# Patient Record
Sex: FEMALE | Race: WHITE
[De-identification: names, ages, dates, MRNs, and addresses within clinical notes are randomized per-mention and may not be internally consistent; named-entity substitution may affect disease eponyms.]

---

## 2017-01-03 NOTE — RS.OPPTDN
Subjective


Date of Note: 01/03/17


Visit #: 14


Date of Evaluation: 11/29/16


Payer Source: Medicaid


Treatment Diagnosis: right knee pain, effusion, stiffness, s/p right TKA


Current Subjective/complaints:: Reports having a good day today,minimal knee 

pain at this time.


*Precautions: none





Pain Assessment





- Pain Description


Pain Location: right knee


Pain Description: Dull, Aching


Current Pain Intensity: not rated today





Interventions





- Exercise/Activities/Manual Therapy


Exercises/Activities: 45 mins. total of TKA protocol in supine and sitting,

ended session with 3/15 reps. on leg press with 30#.AROM :flexion 120 extension 

0.


Total minutes of Exercise: 45


Manual Therapy: NA


Total minutes of Manual Therapy: 0


HOME EXERCISE PROGRAM: QS, HS, SLR, hip abd, heel slides in supine and in 

sitting. Passive flexion and extension stretching.





- Charges


Total Direct Minutes: 45


Total Treatment Time: 45


Procedures billed for this date of service:: ex 3


Assessment: Patient reports no increased knee pain today with exercises,only 

fatigue.She has less edema,no warmth today.She has steadier gait ,with much 

better heel strike on the R.


Patient Education: Education of Plan of Care


Patient demonstrates compliance with HEP?: Yes





Short Term Goals


Goal #1: Left knee flexion to 95 degrees.


Goal to be met by: 12/14/16


Progress towards Goal:: Met


Goal #2: L knee to full extension.


Progress towards Goal:: Met


Goal #3: Right knee pain <6/10 at rest.


Goal to be met by: 12/14/16


Progress towards Goal:: Progressing


Goal #4: Pt to demonstrate good heelstrike and knee flexion with ambulation in 

dept.


Goal to be met by: 12/14/16


Progress towards Goal:: Progressing





Long Term Goals


Goal #1: Pt able to continue HEP to maintain functional level at discharge.


Goal to be met by: 01/14/17


Progress towards goal: Partially Met


Goal #2: Score on LE functional scale improved to 50/80.


Goal to be met by: 01/14/17


Progress towards goal: Progressing


Goal #3: Patient to ambulate without Asst device household/community distances.


Goal to be met by: 01/14/17


Progress towards goal: Met


Goal #4: Patient able to perform all functional/work activities without 

difficulty.


Goal to be met by: 01/14/17


Progress towards goal: Progressing





Plan


PLAN OF CARE EXPIRES ON:: 01/14/17


ORDER # VISITS AND/OR THROUGH DATE: 01/14/17


PLAN: Continue Plan of Care

## 2017-01-04 NOTE — RS.OPPTDN
Subjective


Date of Note: 01/04/17


Visit #: 15


Date of Evaluation: 11/29/16


Payer Source: Medicaid


Treatment Diagnosis: right knee pain, effusion, stiffness, s/p right TKA


Current Subjective/complaints:: I had a blister over the top of my incision and 

I alcoholed the end of a needle and busted it.  Some yellowish puss came out 

and something weird that looked like a hair.  It feels a lot better now.


*Precautions: none





Pain Assessment





- Pain Description


Pain Location: right knee


Pain Description: Dull, Aching


Current Pain Intensity: not rated today





Interventions





- Exercise/Activities/Manual Therapy


Exercises/Activities: 45 mins. total of TKA protocol in supine and sitting 3/17 

with 5 lbs for SAQ, LAQ, HS.  SLR caused increased right groin pain.  Multiple 

trigger points and tender points were palpable in this area.  She also 

performed 3/15 reps. on leg press with 45#.


Manual Therapy: NA


HOME EXERCISE PROGRAM: QS, HS, SLR, hip abd, heel slides in supine and in 

sitting. Passive flexion and extension stretching.





- Charges


Total Direct Minutes: 45


Total Treatment Time: 45


Procedures billed for this date of service:: ther ex X 3


Assessment: Patient tolerated tx well and has no increase in pain with increase 

in resistance.


Patient demonstrates compliance with HEP?: Yes





Short Term Goals


Goal #1: Left knee flexion to 95 degrees.


Goal to be met by: 12/14/16


Progress towards Goal:: Met


Goal #2: L knee to full extension.


Progress towards Goal:: Met


Goal #3: Right knee pain <6/10 at rest.


Goal to be met by: 12/14/16


Progress towards Goal:: Progressing


Goal #4: Pt to demonstrate good heelstrike and knee flexion with ambulation in 

dept.


Goal to be met by: 12/14/16


Progress towards Goal:: Progressing





Long Term Goals


Goal #1: Pt able to continue HEP to maintain functional level at discharge.


Goal to be met by: 01/14/17


Progress towards goal: Partially Met


Goal #2: Score on LE functional scale improved to 50/80.


Goal to be met by: 01/14/17


Progress towards goal: Progressing


Goal #3: Patient to ambulate without Asst device household/community distances.


Goal to be met by: 01/14/17


Progress towards goal: Met


Goal #4: Patient able to perform all functional/work activities without 

difficulty.


Goal to be met by: 01/14/17


Progress towards goal: Progressing





Plan


PLAN OF CARE EXPIRES ON:: 01/14/17


ORDER # VISITS AND/OR THROUGH DATE: 01/14/17


PLAN: Continue Plan of Care

## 2017-01-06 NOTE — RS.OPPTDN
Subjective


Date of Note: 01/06/17


Visit #: 16


Date of Evaluation: 11/29/16


Payer Source: Medicaid


Treatment Diagnosis: right knee pain, effusion, stiffness, s/p right TKA


Current Subjective/complaints:: Reports he rpain is elevated today ovwerall,not 

just the R knee.(Patient has RA.)


*Precautions: none





Pain Assessment





- Pain Description


Pain Location: right knee


Pain Description: Dull, Aching


Current Pain Intensity: 7/10


Other Comments regarding Pain:: less pain after heat and exercise





- Heat/Cryotherapy


Treatment: Hot Pack (20 mins. prior to exercises)





Interventions





- Exercise/Activities/Manual Therapy


Exercises/Activities: 35 mins. total of TKA protocol in supine and sitting 3/17 

with 3 lbs for SAQ, LAQ, HS.  SLR without resitance today   She also performed 3

/15 reps. on leg press with 45#.


Total minutes of Exercise: 35


Manual Therapy: NA


Total minutes of Manual Therapy: 0


HOME EXERCISE PROGRAM: QS, HS, SLR, hip abd, heel slides in supine and in 

sitting. Passive flexion and extension stretching.





- Charges


Total Direct Minutes: 35


Total Treatment Time: 55


Procedures billed for this date of service:: hp,ex 2


Assessment: Patient continues to have improved firing of the quads,functional 

ROM with less intense pain at end ROM.She is highly motivated to improve ,

compliant to all recommendations of the therapy staff.


Patient Education: Education of diagnosis, Body/Joint mechanics, Home Exercise 

Program, Home Safety, Activity Modification, Education of Plan of Care


Patient demonstrates compliance with HEP?: Yes





Short Term Goals


Goal #1: Left knee flexion to 95 degrees.


Goal to be met by: 12/14/16


Progress towards Goal:: Met


Goal #2: L knee to full extension.


Progress towards Goal:: Met


Goal #3: Right knee pain <6/10 at rest.


Goal to be met by: 12/14/16


Progress towards Goal:: Partially Met


Goal #4: Pt to demonstrate good heelstrike and knee flexion with ambulation in 

dept.


Goal to be met by: 12/14/16


Progress towards Goal:: Partially Met





Long Term Goals


Goal #1: Pt able to continue HEP to maintain functional level at discharge.


Goal to be met by: 01/14/17


Progress towards goal: Partially Met


Goal #2: Score on LE functional scale improved to 50/80.


Goal to be met by: 01/14/17


Progress towards goal: Progressing


Goal #3: Patient to ambulate without Asst device household/community distances.


Goal to be met by: 01/14/17


Progress towards goal: Met


Goal #4: Patient able to perform all functional/work activities without 

difficulty.


Goal to be met by: 01/14/17


Progress towards goal: Progressing





Plan


PLAN OF CARE EXPIRES ON:: 01/14/17


ORDER # VISITS AND/OR THROUGH DATE: 01/14/17


PLAN: Continue Plan of Care

## 2017-01-09 ENCOUNTER — HOSPITAL ENCOUNTER (OUTPATIENT)
Dept: HOSPITAL 58 - REHAB | Age: 42
LOS: 22 days | End: 2017-01-31

## 2017-01-09 VITALS — BODY MASS INDEX: 39.9 KG/M2

## 2017-01-09 DIAGNOSIS — Z96.651: ICD-10-CM

## 2017-01-09 DIAGNOSIS — M06.861: Primary | ICD-10-CM

## 2017-01-09 NOTE — RS.OPPTDN
Subjective


Date of Note: 01/09/17


Visit #: 17


Date of Evaluation: 11/29/16


Payer Source: Medicaid


Treatment Diagnosis: right knee pain, effusion, stiffness, s/p right TKA


Current Subjective/complaints:: Reports the weather change is causing her knee 

to ache ,but it is tolerable.


*Precautions: none





Pain Assessment





- Pain Description


Pain Location: right knee


Pain Description: Dull, Aching


Current Pain Intensity: 4/10





- Heat/Cryotherapy


Treatment: Cryotherapy (20 mins. prior to exercises)





Interventions





- Exercise/Activities/Manual Therapy


Exercises/Activities: 40 mins. total of TKA protocol in supine and sitting 3/15 

reps. with 4 lbs for SAQ, LAQ, HS. Ankle pumps with red theraband SLR without 

resitance today   She also performed 3/15 reps. on leg press with 45#.Supine 

AROM is 117 flexion,0 extension.


Total minutes of Exercise: 40


Manual Therapy: NA


Total minutes of Manual Therapy: 0


HOME EXERCISE PROGRAM: QS, HS, SLR, hip abd, heel slides in supine and in 

sitting. Passive flexion and extension stretching.





- Charges


Total Direct Minutes: 40


Total Treatment Time: 60


Procedures billed for this date of service:: cp,ex 3


Assessment: Patient continues to have improved strength in quads,minimal 

decrease in flexion today due to minimal increase in edema,but no elevated 

warmth in the R knee.She has excellent understanding of HEP and joint 

protection.


Patient Education: Education of diagnosis, Body/Joint mechanics, Home Exercise 

Program, Home Safety, Activity Modification, Education of Plan of Care


Patient demonstrates compliance with HEP?: Yes





Short Term Goals


Goal #1: Left knee flexion to 95 degrees.


Goal to be met by: 12/14/16


Progress towards Goal:: Met


Goal #2: L knee to full extension.


Progress towards Goal:: Met


Goal #3: Right knee pain <6/10 at rest.


Goal to be met by: 12/14/16


Progress towards Goal:: Met


Goal #4: Pt to demonstrate good heelstrike and knee flexion with ambulation in 

dept.


Goal to be met by: 12/14/16


Progress towards Goal:: Partially Met





Long Term Goals


Goal #1: Pt able to continue HEP to maintain functional level at discharge.


Goal to be met by: 01/14/17


Progress towards goal: Partially Met


Goal #2: Score on LE functional scale improved to 50/80.


Goal to be met by: 01/14/17


Progress towards goal: Progressing


Goal #3: Patient to ambulate without Asst device household/community distances.


Goal to be met by: 01/14/17


Progress towards goal: Met


Goal #4: Patient able to perform all functional/work activities without 

difficulty.


Goal to be met by: 01/14/17


Progress towards goal: Progressing





Plan


PLAN OF CARE EXPIRES ON:: 01/14/17


ORDER # VISITS AND/OR THROUGH DATE: 01/14/17


PLAN: Continue Plan of Care

## 2017-01-11 NOTE — RS.QUICKDC
Discharge from PT


Date of Discharge: 06/28/13


Number of Visits: 17


Reason for Discharge: Patient called and cancelled due to flare up of RA 

pain.She returns to work this week, was to be her last PT session.She is 

pleased with her progress,has good understanding of her HEP ,joint protection.

## 2017-04-12 ENCOUNTER — HOSPITAL ENCOUNTER (EMERGENCY)
Dept: HOSPITAL 58 - ED | Age: 42
Discharge: HOME | End: 2017-04-12

## 2017-04-12 VITALS — SYSTOLIC BLOOD PRESSURE: 186 MMHG | DIASTOLIC BLOOD PRESSURE: 113 MMHG | TEMPERATURE: 96.7 F

## 2017-04-12 VITALS — BODY MASS INDEX: 37.8 KG/M2

## 2017-04-12 DIAGNOSIS — R63.4: ICD-10-CM

## 2017-04-12 DIAGNOSIS — Z79.899: ICD-10-CM

## 2017-04-12 DIAGNOSIS — I10: ICD-10-CM

## 2017-04-12 DIAGNOSIS — R07.89: Primary | ICD-10-CM

## 2017-04-12 LAB
ALBUMIN SERPL-MCNC: 3.8 G/DL (ref 3.4–5)
ALBUMIN/GLOB SERPL: 1 {RATIO}
ALP SERPL-CCNC: 101 U/L (ref 42–98)
ALT SERPL-CCNC: 15 U/L (ref 12–78)
ANION GAP SERPL CALC-SCNC: 13.7 MMOL/L
AST SERPL-CCNC: 14 U/L (ref 15–37)
BASOPHILS # BLD AUTO: 0 K/UL (ref 0–0.2)
BASOPHILS NFR BLD AUTO: 0.5 % (ref 0–3)
BILIRUB SERPL-MCNC: 0.27 MG/DL (ref 0–1.2)
BUN SERPL-MCNC: 13 MG/DL (ref 7–18)
BUN/CREAT SERPL: 19.69
CALCIUM SERPL-MCNC: 9.5 MG/DL (ref 8.2–10.2)
CHLORIDE SERPL-SCNC: 104 MMOL/L (ref 98–107)
CK SERPL-CCNC: 45 U/L
CO2 BLD-SCNC: 24 MMOL/L (ref 21–32)
CREAT SERPL-MCNC: 0.66 MG/DL (ref 0.6–1.3)
EOSINOPHIL # BLD AUTO: 0.2 K/UL (ref 0–0.7)
EOSINOPHIL NFR BLD AUTO: 2.6 % (ref 0–7)
GFR SERPLBLD BASED ON 1.73 SQ M-ARVRAT: 97 ML/MIN
GLOBULIN SER CALC-MCNC: 3.8 G/L
GLUCOSE SERPL-MCNC: 97 MG/DL (ref 70–110)
HCT VFR BLD AUTO: 32.3 % (ref 37–47)
HGB BLD-MCNC: 10.4 G/DL (ref 12–16)
IMM GRANULOCYTES NFR BLD AUTO: 0.3 % (ref 0–5)
LYMPHOCYTES # SPEC AUTO: 2.6 K/UL (ref 0.6–3.4)
LYMPHOCYTES NFR BLD AUTO: 30 % (ref 10–50)
MCH RBC QN: 23.9 PG (ref 27–31)
MCHC RBC AUTO-ENTMCNC: 32.2 G/DL (ref 31.8–35.4)
MCV RBC: 74.3 FL (ref 81–99)
MONOCYTES # BLD AUTO: 0.6 K/UL (ref 0.4–2)
MONOCYTES NFR BLD AUTO: 6.3 % (ref 0–10)
NEUTROPHILS # BLD AUTO: 5.3 K/UL (ref 2–6.9)
NEUTROPHILS NFR BLD AUTO: 60.3 %
PLATELET # BLD AUTO: 363 10^3/UL (ref 140–440)
POTASSIUM SERPL-SCNC: 3.7 MMOL/L (ref 3.5–5.1)
PROT SERPL-MCNC: 7.6 G/DL (ref 6.4–8.2)
RBC # BLD AUTO: 4.35 10^6/UL (ref 4.2–5.4)
SODIUM SERPL-SCNC: 138 MMOL/L (ref 136–145)
WBC # BLD AUTO: 8.71 K/UL (ref 4.6–10.2)

## 2017-04-12 PROCEDURE — 82550 ASSAY OF CK (CPK): CPT

## 2017-04-12 PROCEDURE — 99283 EMERGENCY DEPT VISIT LOW MDM: CPT

## 2017-04-12 PROCEDURE — 36415 COLL VENOUS BLD VENIPUNCTURE: CPT

## 2017-04-12 PROCEDURE — 80053 COMPREHEN METABOLIC PANEL: CPT

## 2017-04-12 PROCEDURE — 85025 COMPLETE CBC W/AUTO DIFF WBC: CPT

## 2017-04-12 PROCEDURE — 83880 ASSAY OF NATRIURETIC PEPTIDE: CPT

## 2017-04-12 PROCEDURE — 93005 ELECTROCARDIOGRAM TRACING: CPT

## 2017-04-12 PROCEDURE — 84484 ASSAY OF TROPONIN QUANT: CPT

## 2017-04-12 PROCEDURE — 93010 ELECTROCARDIOGRAM REPORT: CPT

## 2017-04-12 NOTE — DI
EXAM:  Single view of the chest. 

  

History:  Chest pain. 

  

Comparison:  Chest radiograph 05/26/2016 

  

Findings:  Heart size is normal.  No focal consolidation.  No appreciable pleural fluid and no pneum
othorax.  No acute osseous abnormalities. 

  

Impression:  No acute cardiopulmonary process.

## 2017-04-12 NOTE — ED.PDOC
General


ED Provider: 


Dr. BRIGITTE MCGEE JR





Chief Complaint: Chest Pain


Stated Complaint: CHEST DISCOMFORT, ELEVATED B/P AND HEADACHE for 3 dasy noted 

excessive blood pressure at work many years of lisiinopril[End]96.7 86 16 99% 

186.113 8/10.  note has had 25 pound weight loss withminimal effort- expressed 

concern to PMD no bruising abdominal pain focal signs- recc follow up with PMD


Time Seen by Physician: 13:44


Mode of Arrival: Walk-In


Information Source: Patient


Exam Limitations: No limitations


Primary Care Provider: 


PHUC LAWSON





Nursing and Triage Documentation Reviewed and Agree: No





Review of Systems





- Review Of Systems


Constitutional: Reports: Malaise


Eyes: Reports: No symptoms


Respiratory: Reports: No symptoms


Cardiac: Reports: Chest pain


GI: Reports: No symptoms


: Reports: No symptoms


Musculoskeletal: Reports: Joint pain, Muscle pain


Skin: Reports: No symptoms


Neurological: Reports: No symptoms


Endocrine: Reports: No symptoms


Hematologic/Lymphatic: Reports: No symptoms


All Other Systems: Other





Past Medical History





- Past Medical History


Previously Healthy: No (R.A.)


Endocrine: Reports: None


Cardiovascular: Reports: Hypertension


Respiratory: Reports: None


Hematological: Reports: Anemia


Gastrointestinal: Reports: GERD


Genitourinary: Reports: Kidney stones


Neuro/Psych: Reports: None


Musculoskeletal: Reports: Arthritis (RA)


Cancer: Reports: None


Last Menstrual Period: NOW


Other Pertinent Past Medical History: RA





- Surgical History


General Surgical History: Reports: Tubal ligation, , Cholecystectomy, 

Tonsillectomy, Adenoidectomy, Orthopedic ( LEFT KNEE REPLACED, RIGHT KNEE 

REPLACED)





- Family History


Family History: Reports: Unknown





- Social History


Smoking Status: Never smoker


Hx Substance Use: No


Alcohol Screening: None





Physical Exam





- Physical Exam


Appearance: Well-appearing, Obese





Interpretation





- Radiology Interpretation


Radiology Interpretation By: Radiologist


Radiology Results: No acute changes


Exam Interpreted: CXR





- EKG Interpretation


Time of EKG #1: 14:00


Rate: Normal


Rhythm: Sinus


Ectopy: None


Axis: NL


ST Segment: Normal





Re-Evaluation





- Re-Evaluation


Time of Re-Evaluation: 14:31 (pain and tenderness left ant chest and scapula 

pleuritic in character)


Status: Improved





Critical Care Note





- Critical Care Note


Total Time (mins): 20





Course





- Course


Hematology/Chemistry: 


 17 14:00





 17 14:00


Orders, Labs, Meds: 


Lab Review











  17





  14:00


 


WBC  8.71


 


RBC  4.35


 


Hgb  10.4 L


 


Hct  32.3 L


 


MCV  74.3 L


 


MCH  23.9 L


 


MCHC  32.2


 


RDW Coeff of Yue  19.1 H


 


Plt Count  363


 


Immature Gran % (Auto)  0.3


 


Neut % (Auto)  60.3


 


Lymph % (Auto)  30.0


 


Mono % (Auto)  6.3


 


Eos % (Auto)  2.6


 


Baso % (Auto)  0.5


 


Immature Gran # (Auto)  0.0


 


Neut #  5.3


 


Lymph #  2.6


 


Mono #  0.6


 


Eos #  0.2


 


Baso #  0.0


 


Sodium  138


 


Potassium  3.7


 


Chloride  104


 


Carbon Dioxide  24


 


Anion Gap  13.7


 


BUN  13


 


Creatinine  0.66


 


Estimated GFR (MDRD)  97.00


 


BUN/Creatinine Ratio  19.69


 


Glucose  97


 


Calcium  9.5


 


Total Bilirubin  0.27


 


AST  14 L


 


ALT  15


 


Alkaline Phosphatase  101 H


 


Total Creatine Kinase  45


 


Troponin I  < 0.0100


 


B-Natriuretic Peptide  74


 


Total Protein  7.6


 


Albumin  3.8


 


Globulin  3.8


 


Albumin/Globulin Ratio  1.00








Orders











 Category Date Time Status


 


 EKG-(ED ONLY) Stat CARDIO  17 13:45 Completed


 


 ED CARDIAC MONITOR APPLIED .ONCE EMERGENCY  17 13:45 Active


 


 ED IV/MEDIPORT/POWERPORT .ONCE EMERGENCY  17 13:45 Active


 


 B-TYPE NATRIURETIC PEPTIDE Stat LAB  17 14:00 Completed


 


 CBC W/ AUTO DIFF Stat LAB  17 14:00 Completed


 


 COMPREHENSIVE METABOLIC PANEL Stat LAB  17 14:00 Completed


 


 CREATINE KINASE Stat LAB  17 14:00 Completed


 


 TROPONIN I Stat LAB  17 14:00 Completed


 


 0.9 % Sodium Chloride [Saline Flush] MEDS  17 13:45 Active





 1 syr IVF PRN PRN   


 


 Aspirin [Aspirin Chewable] MEDS  17 13:45 Discontinued





 324 mg PO ONCE STA   


 


 Lisinopril [Zestril] MEDS  17 14:44 Discontinued





 20 mg PO ONCE STA   


 


 Nitroglycerin [Nitrostat] MEDS  17 13:45 Active





 0.4 mg SL Q5MIN X 3 DOSES PRN   


 


 CHEST, 1V AP ONLY Stat RADS  17 13:45 Completed








Medications











Generic Name Dose Route Start Last Admin





  Trade Name Freq  PRN Reason Stop Dose Admin


 


Nitroglycerin  0.4 mg  17 13:45  





  Nitrostat  SL   





  Q5MIN X 3 DOSES PRN   





  Chest Pain   


 


Sodium Chloride  1 syr  17 13:45  17 14:26





  Saline Flush  IVF   1 syr





  PRN PRN   Administration





  To flush IV   














Discontinued Medications














Generic Name Dose Route Start Last Admin





  Trade Name Freq  PRN Reason Stop Dose Admin


 


Aspirin  324 mg  17 13:45  17 14:25





  Aspirin Chewable  PO  17 13:46  324 mg





  ONCE STA   Administration


 


Lisinopril  20 mg  17 14:44  17 14:56





  Zestril  PO  17 14:45  20 mg





  ONCE STA   Administration











Vital Signs: 


 











  Temp Pulse Resp BP Pulse Ox


 


 17 13:33  96.7 F L  86  16  186/113 H  99














CHARAN Risk Score


CHARAN Risk Score: 


Risk Score      Odds of death by 30D


      0                 0.1 (0.1-0.2)


      1                 0.3 (0.2-0.3)


      2                 0.4 (0.3-0.5)


      3                 0.7 (0.6-0.9)


      4                 1.2 (1.0-1.5)


      5                 2.2 (1.9-2.6)


      6                 3.0 (2.5-3.6)


      7                 4.8 (3.8-6.1)








Departure





- Departure


Time of Disposition: 14:38


Disposition: HOME SELF-CARE


Discharge Problem: 


 Chest pain





Instructions:  Chest Wall Pain (ED)


Condition: Good


Pt referred to PMD for follow-up: Yes


Additional Instructions: 


increase lisinopril to 20 mg daily


check blood pressure daily and record


follow up pmd 1-2 weeks sooner if not resolved


discuss weight loss with PMD


discuss use of aspirin for pain with rheumatologist


may use ultram for chest wall pain not controlled with NSAIDS





Prescriptions: 


Lisinopril 20 mg PO DAILY #30 tablet


Allergies/Adverse Reactions: 


Allergies





hydroxychloroquine sulfate [From Plaquenil] Adverse Reaction (Verified 17 

13:28)


 








Home Medications: 


Ambulatory Orders





Folic Acid 0.4 mg PO DAILY 09/16/15 


Ibuprofen 800 mg PO BID 09/16/15 


Lisinopril 10 mg PO DAILY 09/16/15 


Pantoprazole Sodium [Protonix] 40 mg PO DAILY #30 tablet. 16 


Tramadol HCl [Ultram] 50 mg PO Q6H PRN #14 tablet 10/03/16 


Adalimumab [Humira] 40 mg SQ WEEKLY 17 


Lisinopril 20 mg PO DAILY #30 tablet 17 


Methotrexate Sodium/Pf [Methotrexate 1 Gram/40 ml Vial] 1 ml IJ WEEKLY 17

## 2018-06-19 ENCOUNTER — HOSPITAL ENCOUNTER (EMERGENCY)
Dept: HOSPITAL 58 - ED | Age: 43
Discharge: HOME | End: 2018-06-19

## 2018-06-19 VITALS — BODY MASS INDEX: 33.3 KG/M2

## 2018-06-19 VITALS — SYSTOLIC BLOOD PRESSURE: 146 MMHG | DIASTOLIC BLOOD PRESSURE: 93 MMHG | TEMPERATURE: 97.4 F
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
## 2018-06-19 DIAGNOSIS — S93.401A: Primary | ICD-10-CM

## 2018-06-19 PROCEDURE — 99283 EMERGENCY DEPT VISIT LOW MDM: CPT

## 2018-06-19 NOTE — DI
EXAM: Right foot; PA, lateral, and oblique views 

  

HISTORY:  Right foot trauma, twisting injury 

  

FINDINGS: Plantar and right calcaneal enthesophytes are noted.  The bones are osteopenic.  There is m
ild osteoarthritis within the forefoot diffusely. No fracture or subluxation are detected. 

  

OPINION: 

  

No fracture or subluxation. 

  

Osteopenia. 

  

Mild osteoarthritis in the forefoot.

## 2018-06-19 NOTE — ED.PDOC
General


ED Provider: 


Dr. SONNY GARZA





Chief Complaint: Foot Pain/Injury


Stated Complaint: Rt Foot and ankle pain.  Twisted her ankle last week when 

stepping on an object on her walkway. Pain persisted and area edematous. Hx RA


Time Seen by Physician: 18:15


Mode of Arrival: Walk-In


Information Source: Patient


Exam Limitations: No limitations


Primary Care Provider: 


PHUC LAWSON





Nursing and Triage Documentation Reviewed and Agree: Yes


Reviewed sepsis parameters & appropriate labs ordered?: Yes


System Inflammatory Response Syndrome: Not Applicable


Sepsis Protocol: 


For patient's 13 years and over:





Temp is 96.8 and below  and greater


Pulse >90 BPM


Resp >20/minute


Acutely Altered Mental Status





Are patient's symptoms suggestive of a new infection, such as:


   -Pneumonia


   -Skin, Soft Tissue


   -Endocarditis


   -UTI


   -Bone, Joint Infection


   -Implantable Device


   -Acute Abdominal Infection


   -Wound Infection


   -Meningitis


   -Blood Stream Catheter Infection


   -Unknown








Musculoskeletal Complaint Exam





- Ankle/Foot Complaint/Exam


Location of Injury: Reports: Right, Ankle, Foot


Mechanism of Injury: Reports: Trauma


Onset/Duration: 1 week


Symptoms Are: Reports: Still present


Onset of Pain: Reports: Immediate


Initial Severity: Moderate


Current Severity: Moderate


Location: Reports: Discrete, Radiating


Character: Reports: Sharp, Aching


Alleviating: Reports: Rest


Aggravating: Reports: Movement, Weight bearing


Able to Bear Weight: Yes


Associated Signs and Symptoms: Reports: Swelling


Related History: Denies: Similar episode, Occupational injury


Gout Risk Factors: Reports: None


Related Surgical History: Reports: None


Lower Extremity Findings: Present: Swelling


Achilles Tendon Abnormality: No


Tenderness: Present: Lateral malleolus, Midfoot, Metatarsals (5th Base 5th MT)


Differential Diagnosis: Closed Fracture, Sprain





Review of Systems





- Review Of Systems


Constitutional: Reports: No symptoms


Eyes: Reports: No symptoms


Ears, Nose, Mouth, Throat: Reports: No symptoms


Respiratory: Reports: No symptoms


Cardiac: Reports: No symptoms


GI: Reports: No symptoms


: Reports: No symptoms


Musculoskeletal: Reports: No symptoms, Back pain, Joint pain, Joint swelling, 

Muscle pain, Muscle stiffness


Skin: Reports: No symptoms


Neurological: Reports: No symptoms


Endocrine: Reports: No symptoms


Hematologic/Lymphatic: Reports: No symptoms


All Other Systems: Reviewed and Negative





Past Medical History





- Past Medical History


Previously Healthy: No (R.A.)


Endocrine: Reports: None


Cardiovascular: Reports: Hypertension


Respiratory: Reports: None


Hematological: Reports: Anemia


Gastrointestinal: Reports: GERD


Genitourinary: Reports: Kidney stones


Neuro/Psych: Reports: None


Musculoskeletal: Reports: Arthritis (RA)


Cancer: Reports: None


Last Menstrual Period: 1 week ago


Other Pertinent Past Medical History: RA





- Surgical History


General Surgical History: Reports: Tubal ligation, , Cholecystectomy, 

Tonsillectomy, Adenoidectomy, Orthopedic ( LEFT KNEE REPLACED, RIGHT KNEE 

REPLACED)





- Family History


Family History: Reports: Unknown





- Social History


Smoking Status: Never smoker


Hx Substance Use: No


Alcohol Screening: None





Physical Exam





- Physical Exam


Appearance: Well-appearing


Pain Distress: Mild


Eyes: OJRGE, EOMI, Conjunctiva clear


ENT: Ears normal, Nose normal, Oropharynx normal


Respiratory: Airway patent, Breath sounds clear, Breath sounds equal, 

Respirations nonlabored


Cardiovascular: RRR, Pulses normal, No rub, No murmur


GI/: Soft, Nontender, No masses, Bowel sounds normal, No Organomegaly


Musculoskeletal: Limited ROM, Edema (rt lat ankle)


Skin: Warm


Neurological: Sensation intact, Motor intact, Reflexes intact, Alert, Oriented


Psychiatric: Affect appropriate





Critical Care Note





- Critical Care Note


Total Time (mins): 0





Course





- Course


Orders, Labs, Meds: 


Orders











 Category Date Time Status


 


 ANKLE, RIGHT MIN 3 VIEWS Stat RADS  18 18:30 Completed


 


 FOOT, RIGHT 3 VIEWS Stat RADS  18 18:30 Completed











Vital Signs: 


 











  Temp Pulse Resp BP Pulse Ox


 


 18 15:31  97.4 F L  86  20  146/93 H  98














Departure





- Departure


Time of Disposition: 19:00


Disposition: HOME SELF-CARE


Discharge Problem: 


 Ankle sprain





Instructions:  Ankle Sprain (ED)


Condition: Good


Pt referred to PMD for follow-up: Yes (prn)


IPMP verified?: No


Additional Instructions: 


Ice for swelling


Warm moist heat to reduce inflamatiion


advil 200 mg 2-3 4 times daily for relief of pain 


Crutches to minimize weight bearing ambulation until swelling and pain eased up 


Allergies/Adverse Reactions: 


Allergies





hydroxychloroquine sulfate [From Plaquenil] Adverse Reaction (Verified 18 

15:35)


 








Home Medications: 


Ambulatory Orders





Folic Acid 0.4 mg PO DAILY 09/16/15 


Ibuprofen 800 mg PO BID 09/16/15 


Lisinopril 20 mg PO DAILY #30 tablet 17 


Methotrexate Sodium/Pf [Methotrexate 1 Gram/40 ml Vial] 1 ml IJ WEEKLY 17 


Omeprazole 20 mg PO DAILY 18 


Tofacitinib Citrate [Xeljanz Xr] 11 mg PO DAILY 18 


Tramadol HCl [Ultram] 50 mg PO TID PRN 18

## 2018-06-19 NOTE — DI
EXAM: Right ankle; AP, lateral, and oblique views 

  

HISTORY:  Right ankle trauma 

  

FINDINGS: The soft tissues are diffusely edematous.  The ankle joint spaces are maintained.  No fract
ure or subluxation is appreciated.  Small plantar and retrocalcaneal enthesiophytes are noted. 

  

OPINION: No fracture or subluxation. 

  

Diffuse soft tissue edema.